# Patient Record
Sex: MALE | Race: WHITE | NOT HISPANIC OR LATINO | Employment: OTHER | URBAN - NONMETROPOLITAN AREA
[De-identification: names, ages, dates, MRNs, and addresses within clinical notes are randomized per-mention and may not be internally consistent; named-entity substitution may affect disease eponyms.]

---

## 2018-11-07 ENCOUNTER — OFFICE VISIT (OUTPATIENT)
Dept: SURGERY | Facility: CLINIC | Age: 58
End: 2018-11-07

## 2018-11-07 VITALS
DIASTOLIC BLOOD PRESSURE: 74 MMHG | SYSTOLIC BLOOD PRESSURE: 128 MMHG | OXYGEN SATURATION: 98 % | HEIGHT: 70 IN | HEART RATE: 60 BPM | WEIGHT: 229.2 LBS | BODY MASS INDEX: 32.81 KG/M2 | TEMPERATURE: 97.7 F

## 2018-11-07 DIAGNOSIS — K62.5 RECTAL BLEEDING: ICD-10-CM

## 2018-11-07 DIAGNOSIS — R10.30 LOWER ABDOMINAL PAIN: Primary | ICD-10-CM

## 2018-11-07 DIAGNOSIS — K59.04 CHRONIC IDIOPATHIC CONSTIPATION: ICD-10-CM

## 2018-11-07 PROCEDURE — 99213 OFFICE O/P EST LOW 20 MIN: CPT | Performed by: SURGERY

## 2018-11-07 RX ORDER — AMLODIPINE BESYLATE 10 MG/1
10 TABLET ORAL DAILY
Refills: 1 | COMMUNITY
Start: 2018-08-27

## 2018-11-07 RX ORDER — SIMVASTATIN 40 MG
40 TABLET ORAL NIGHTLY
Refills: 1 | COMMUNITY
Start: 2018-08-27

## 2018-11-07 RX ORDER — OMEPRAZOLE 40 MG/1
40 CAPSULE, DELAYED RELEASE ORAL DAILY
Refills: 1 | COMMUNITY
Start: 2018-08-27

## 2018-11-07 RX ORDER — ALBUTEROL SULFATE 1.25 MG/3ML
1 SOLUTION RESPIRATORY (INHALATION) EVERY 6 HOURS PRN
COMMUNITY

## 2018-11-07 RX ORDER — PREDNISONE 20 MG/1
20 TABLET ORAL DAILY PRN
Refills: 0 | COMMUNITY
Start: 2018-10-02

## 2018-11-07 RX ORDER — TAMSULOSIN HYDROCHLORIDE 0.4 MG/1
1 CAPSULE ORAL DAILY
Refills: 1 | COMMUNITY
Start: 2018-10-01

## 2018-11-07 RX ORDER — IBUPROFEN 200 MG
400 TABLET ORAL EVERY MORNING
COMMUNITY

## 2018-11-07 RX ORDER — BISOPROLOL FUMARATE 5 MG/1
5 TABLET, FILM COATED ORAL DAILY
Refills: 1 | COMMUNITY
Start: 2018-08-27

## 2018-11-07 RX ORDER — OXYBUTYNIN CHLORIDE 10 MG/1
10 TABLET, EXTENDED RELEASE ORAL DAILY
Refills: 1 | COMMUNITY
Start: 2018-10-01

## 2018-11-07 RX ORDER — ASPIRIN 325 MG
325 TABLET ORAL DAILY
COMMUNITY

## 2018-11-07 RX ORDER — BISACODYL 5 MG/1
10 TABLET, DELAYED RELEASE ORAL 2 TIMES DAILY
Qty: 4 TABLET | Refills: 0 | Status: SHIPPED | OUTPATIENT
Start: 2018-11-07 | End: 2018-11-08

## 2018-11-07 RX ORDER — BUDESONIDE AND FORMOTEROL FUMARATE DIHYDRATE 160; 4.5 UG/1; UG/1
2 AEROSOL RESPIRATORY (INHALATION)
Refills: 1 | COMMUNITY
Start: 2018-08-27

## 2018-11-07 RX ORDER — POLYETHYLENE GLYCOL 1450
1 POWDER (GRAM) MISCELLANEOUS
Qty: 238 G | Refills: 0 | Status: SHIPPED | OUTPATIENT
Start: 2018-11-07 | End: 2018-11-07

## 2018-11-07 NOTE — PROGRESS NOTES
Patient: Jason Cervantes    YOB: 1960    Date: 11/07/2018    Primary Care Provider: Cleveland Chance DO    Chief Complaint   Patient presents with   • Hemorrhoids     rectal bleeding for years due to hemorrhoids   • Abdominal Pain     lower abdominal pain   • Constipation     chronic        Subjective .     History of present illness:  I saw the patient in the office today as a consultation for evaluation and treatment of sharp intermittent lower abdominal pain, chronic constipation and rectal bleeding.  Patient states that he has had abdominal pain, constipation and rectal bleeding for many years.  He does have a history of hemorrhoids. Abdominal pain is relieved by a bowel movement. Patient states that he is due for a colonoscopy.  Last colonoscopy was 10 plus years ago and performed by myself.     The patient does have a history significant for rectal bleeding, this is not gotten any better recently.    The following portions of the patient's history were reviewed and updated as appropriate: allergies, current medications, past family history, past medical history, past social history, past surgical history and problem list.      Review of Systems   Constitutional: Negative for chills, fever and unexpected weight change.   HENT: Negative for trouble swallowing and voice change.    Eyes: Negative for visual disturbance.   Respiratory: Positive for cough and shortness of breath. Negative for apnea, chest tightness and wheezing.    Cardiovascular: Positive for chest pain. Negative for palpitations and leg swelling.   Gastrointestinal: Positive for abdominal pain, anal bleeding and constipation. Negative for abdominal distention, blood in stool, diarrhea, nausea, rectal pain and vomiting.   Endocrine: Negative for cold intolerance and heat intolerance.   Genitourinary: Negative for difficulty urinating, dysuria, flank pain, scrotal swelling and testicular pain.   Musculoskeletal: Negative for back  pain, gait problem and joint swelling.   Skin: Negative for color change, rash and wound.   Neurological: Negative for dizziness, syncope, speech difficulty, weakness, numbness and headaches.   Hematological: Negative for adenopathy. Does not bruise/bleed easily.   Psychiatric/Behavioral: Negative for confusion. The patient is not nervous/anxious.        History:  Past Medical History:   Diagnosis Date   • COPD (chronic obstructive pulmonary disease) (CMS/HCC)    • Hypertension        Past Surgical History:   Procedure Laterality Date   • AORTA SURGERY     • APPENDECTOMY     • FACIAL RECONSTRUCTION SURGERY     • LEG SURGERY Left        Family History   Problem Relation Age of Onset   • Cancer Mother    • Hypertension Mother    • Diabetes Father    • Hypertension Father        Social History     Tobacco Use   • Smoking status: Never Smoker   • Smokeless tobacco: Never Used   Substance Use Topics   • Alcohol use: No   • Drug use: Defer       Allergies:  No Known Allergies    Medications:    Current Outpatient Medications:   •  acetaminophen (TYLENOL) 100 MG/ML solution, Take 10 mg/kg by mouth Every 4 (Four) Hours As Needed for Fever., Disp: , Rfl:   •  albuterol (ACCUNEB) 1.25 MG/3ML nebulizer solution, Take 1 ampule by nebulization Every 6 (Six) Hours As Needed for Wheezing., Disp: , Rfl:   •  amLODIPine (NORVASC) 10 MG tablet, , Disp: , Rfl: 1  •  aspirin 325 MG tablet, Take 325 mg by mouth Daily., Disp: , Rfl:   •  bisoprolol (ZEBeta) 5 MG tablet, , Disp: , Rfl: 1  •  ibuprofen (ADVIL,MOTRIN) 200 MG tablet, Take 200 mg by mouth Every 6 (Six) Hours As Needed for Mild Pain ., Disp: , Rfl:   •  omeprazole (priLOSEC) 40 MG capsule, , Disp: , Rfl: 1  •  oxybutynin XL (DITROPAN-XL) 10 MG 24 hr tablet, , Disp: , Rfl: 1  •  predniSONE (DELTASONE) 20 MG tablet, , Disp: , Rfl: 0  •  simvastatin (ZOCOR) 40 MG tablet, , Disp: , Rfl: 1  •  SYMBICORT 160-4.5 MCG/ACT inhaler, , Disp: , Rfl: 1  •  tamsulosin (FLOMAX) 0.4 MG  "capsule 24 hr capsule, , Disp: , Rfl: 1    Objective     Vital Signs:   Vitals:    11/07/18 1434   BP: 128/74   Pulse: 60   Temp: 97.7 °F (36.5 °C)   TempSrc: Temporal Artery    SpO2: 98%   Weight: 104 kg (229 lb 3.2 oz)   Height: 177.8 cm (70\")       Physical Exam:   General Appearance:    Alert, cooperative, in no acute distress   Head:    Normocephalic, without obvious abnormality, atraumatic   Eyes:            Lids and lashes normal, conjunctivae and sclerae normal, no   icterus, no pallor, corneas clear, PERRL   Ears:    Ears appear intact with no abnormalities noted   Throat:   No oral lesions, no thrush, oral mucosa moist   Neck:   No adenopathy, supple, trachea midline, no thyromegaly,  no JVD   Lungs:     Clear to auscultation,respirations regular, even and                  unlabored    Heart:    Regular rhythm and normal rate, normal S1 and S2, no            murmur   Abdomen:     no masses, no organomegaly, soft non-tender, non-distended, no guarding, there is no evidence of tenderness, no evidence of hernia    Extremities:   Moves all extremities well, no edema, no cyanosis, no             redness   Pulses:   Pulses palpable and equal bilaterally   Skin:   No bleeding, bruising or rash   Lymph nodes:   No palpable adenopathy   Neurologic:   Cranial nerves 2 - 12 grossly intact, sensation intact      Results Review:   I reviewed the patient's new clinical results.  I reviewed the patient's new imaging results and agree with the interpretation.  I reviewed the patient's other test results and agree with the interpretation    Review of Systems was reviewed and confirmed as accurate today.    Assessment/Plan :    1. Lower abdominal pain    2. Chronic idiopathic constipation    3. Rectal bleeding        I recommend a colonoscopy for further evaluation. The procedure was explained as well as the risks which include but are not limited to bleeding, infection, perforation, abdominal pain etc. The patient " understands these risks and the procedure and wishes to proceed.  The patient had no questions for me.    Electronically signed by Moshe Guerra MD  11/12/18 2:42 PM  Portions of this note have been scribed for Moshe Guerra MD by Renee Molina 11/12/2018  11:16 AM

## 2018-11-13 PROBLEM — K62.5 RECTAL BLEEDING: Status: ACTIVE | Noted: 2018-11-13

## 2018-11-13 PROBLEM — R10.30 LOWER ABDOMINAL PAIN: Status: ACTIVE | Noted: 2018-11-13

## 2018-11-13 PROBLEM — K59.04 CHRONIC IDIOPATHIC CONSTIPATION: Status: ACTIVE | Noted: 2018-11-13

## 2018-12-06 ENCOUNTER — TELEPHONE (OUTPATIENT)
Dept: SURGERY | Facility: CLINIC | Age: 58
End: 2018-12-06

## 2018-12-06 NOTE — TELEPHONE ENCOUNTER
Called the patient to remind them of an upcoming appointment with general surgery. I was able to reach to reach the patient. Left message.

## 2018-12-07 RX ORDER — LOSARTAN POTASSIUM 50 MG/1
50 TABLET ORAL EVERY OTHER DAY
COMMUNITY

## 2018-12-07 RX ORDER — FUROSEMIDE 20 MG/1
20 TABLET ORAL DAILY PRN
COMMUNITY

## 2018-12-07 RX ORDER — ACETAMINOPHEN 500 MG
1000 TABLET ORAL EVERY MORNING
COMMUNITY

## 2018-12-10 ENCOUNTER — TELEPHONE (OUTPATIENT)
Dept: SURGERY | Facility: CLINIC | Age: 58
End: 2018-12-10

## 2018-12-13 ENCOUNTER — TELEPHONE (OUTPATIENT)
Dept: SURGERY | Facility: CLINIC | Age: 58
End: 2018-12-13

## 2018-12-17 ENCOUNTER — HOSPITAL ENCOUNTER (OUTPATIENT)
Facility: HOSPITAL | Age: 58
Setting detail: HOSPITAL OUTPATIENT SURGERY
Discharge: HOME | End: 2018-12-17
Attending: SURGERY | Admitting: SURGERY

## 2018-12-17 ENCOUNTER — ANESTHESIA EVENT (OUTPATIENT)
Dept: GASTROENTEROLOGY | Facility: HOSPITAL | Age: 58
End: 2018-12-17

## 2018-12-17 ENCOUNTER — ANESTHESIA (OUTPATIENT)
Dept: GASTROENTEROLOGY | Facility: HOSPITAL | Age: 58
End: 2018-12-17

## 2018-12-17 VITALS
DIASTOLIC BLOOD PRESSURE: 54 MMHG | TEMPERATURE: 98.3 F | RESPIRATION RATE: 18 BRPM | HEIGHT: 70 IN | HEART RATE: 58 BPM | SYSTOLIC BLOOD PRESSURE: 108 MMHG | WEIGHT: 223 LBS | BODY MASS INDEX: 31.92 KG/M2 | OXYGEN SATURATION: 96 %

## 2018-12-17 DIAGNOSIS — K59.04 CHRONIC IDIOPATHIC CONSTIPATION: ICD-10-CM

## 2018-12-17 DIAGNOSIS — K62.5 RECTAL BLEEDING: ICD-10-CM

## 2018-12-17 DIAGNOSIS — R10.30 LOWER ABDOMINAL PAIN: ICD-10-CM

## 2018-12-17 PROCEDURE — 25010000002 PROPOFOL 200 MG/20ML EMULSION: Performed by: NURSE ANESTHETIST, CERTIFIED REGISTERED

## 2018-12-17 PROCEDURE — S0260 H&P FOR SURGERY: HCPCS | Performed by: SURGERY

## 2018-12-17 PROCEDURE — 25010000002 MIDAZOLAM PER 1 MG: Performed by: NURSE ANESTHETIST, CERTIFIED REGISTERED

## 2018-12-17 RX ORDER — MAGNESIUM HYDROXIDE 1200 MG/15ML
LIQUID ORAL AS NEEDED
Status: DISCONTINUED | OUTPATIENT
Start: 2018-12-17 | End: 2018-12-17 | Stop reason: HOSPADM

## 2018-12-17 RX ORDER — MEPERIDINE HYDROCHLORIDE 50 MG/ML
INJECTION INTRAMUSCULAR; INTRAVENOUS; SUBCUTANEOUS AS NEEDED
Status: DISCONTINUED | OUTPATIENT
Start: 2018-12-17 | End: 2018-12-17 | Stop reason: SURG

## 2018-12-17 RX ORDER — PROPOFOL 10 MG/ML
INJECTION, EMULSION INTRAVENOUS AS NEEDED
Status: DISCONTINUED | OUTPATIENT
Start: 2018-12-17 | End: 2018-12-17 | Stop reason: SURG

## 2018-12-17 RX ORDER — MIDAZOLAM HYDROCHLORIDE 1 MG/ML
INJECTION INTRAMUSCULAR; INTRAVENOUS AS NEEDED
Status: DISCONTINUED | OUTPATIENT
Start: 2018-12-17 | End: 2018-12-17 | Stop reason: SURG

## 2018-12-17 RX ORDER — SODIUM CHLORIDE, SODIUM LACTATE, POTASSIUM CHLORIDE, CALCIUM CHLORIDE 600; 310; 30; 20 MG/100ML; MG/100ML; MG/100ML; MG/100ML
1000 INJECTION, SOLUTION INTRAVENOUS CONTINUOUS
Status: DISCONTINUED | OUTPATIENT
Start: 2018-12-17 | End: 2018-12-17 | Stop reason: HOSPADM

## 2018-12-17 RX ORDER — SODIUM CHLORIDE 0.9 % (FLUSH) 0.9 %
3 SYRINGE (ML) INJECTION AS NEEDED
Status: DISCONTINUED | OUTPATIENT
Start: 2018-12-17 | End: 2018-12-17 | Stop reason: HOSPADM

## 2018-12-17 RX ADMIN — PROPOFOL 20 MG: 10 INJECTION, EMULSION INTRAVENOUS at 14:11

## 2018-12-17 RX ADMIN — MIDAZOLAM HYDROCHLORIDE 2 MG: 1 INJECTION, SOLUTION INTRAMUSCULAR; INTRAVENOUS at 13:57

## 2018-12-17 RX ADMIN — PROPOFOL 20 MG: 10 INJECTION, EMULSION INTRAVENOUS at 14:05

## 2018-12-17 RX ADMIN — PROPOFOL 20 MG: 10 INJECTION, EMULSION INTRAVENOUS at 14:08

## 2018-12-17 RX ADMIN — PROPOFOL 50 MG: 10 INJECTION, EMULSION INTRAVENOUS at 14:02

## 2018-12-17 RX ADMIN — SODIUM CHLORIDE, POTASSIUM CHLORIDE, SODIUM LACTATE AND CALCIUM CHLORIDE 1000 ML: 600; 310; 30; 20 INJECTION, SOLUTION INTRAVENOUS at 13:04

## 2018-12-17 RX ADMIN — MEPERIDINE HYDROCHLORIDE 50 MG: 50 INJECTION, SOLUTION INTRAMUSCULAR; INTRAVENOUS; SUBCUTANEOUS at 13:59

## 2018-12-17 NOTE — ANESTHESIA PREPROCEDURE EVALUATION
Anesthesia Evaluation     Patient summary reviewed and Nursing notes reviewed   no history of anesthetic complications:  NPO Solid Status: > 8 hours  NPO Liquid Status: > 8 hours           Airway   Mallampati: II  TM distance: >3 FB  Possible difficult intubation, Large neck circumference and Difficult intubation highly probable  Dental    (+) upper dentures and lower dentures    Pulmonary    (+) pneumonia resolved , a smoker Former, COPD, asthma, shortness of breath, sleep apnea, decreased breath sounds,   Cardiovascular     PT is on anticoagulation therapy  Patient on routine beta blocker    (+) hypertension, FALK, PVD, hyperlipidemia,   CAD:  inc risk.      Neuro/Psych  (+) poor historian.,     GI/Hepatic/Renal/Endo    (+) obesity, morbid obesity, GERD, GI bleeding,   Diabetes:  inc risk.    Musculoskeletal     (+) arthralgias, back pain, chronic pain,   Abdominal   (+) obese,    Substance History      OB/GYN          Other   (+) arthritis                     Anesthesia Plan    ASA 3     MAC   (Risks and benefits discussed including risk of aspiration, recall and dental damage. All patient questions answered. Will continue with POC.)  intravenous induction   Anesthetic plan, all risks, benefits, and alternatives have been provided, discussed and informed consent has been obtained with: patient.

## 2018-12-17 NOTE — ANESTHESIA POSTPROCEDURE EVALUATION
Patient: Jason Cervantes    Procedure Summary     Date:  12/17/18 Room / Location:  [facility] ENDOSCOPY 2 / [facility] ENDOSCOPY    Anesthesia Start:  1355 Anesthesia Stop:      Procedure:  COLONOSCOPY WITH HOT BIOPSY POLYPECTOMY (N/A ) Diagnosis:       Lower abdominal pain      Chronic idiopathic constipation      Rectal bleeding      (Lower abdominal pain [R10.30])      (Chronic idiopathic constipation [K59.04])      (Rectal bleeding [K62.5])    Surgeon:  Moshe Guerra MD Provider:  Cleveland Meyer CRNA    Anesthesia Type:  MAC ASA Status:  3          Anesthesia Type: MAC  Last vitals  BP   123/69 (12/17/18 1252)   Temp   98.3 °F (36.8 °C) (12/17/18 1252)   Pulse   57 (12/17/18 1252)   Resp   18 (12/17/18 1252)     SpO2   96 % (12/17/18 1252)     Post Anesthesia Care and Evaluation    Patient location during evaluation: PHASE II  Patient participation: complete - patient participated  Level of consciousness: awake  Pain score: 0  Pain management: adequate  Airway patency: patent  Anesthetic complications: No anesthetic complications  PONV Status: none  Cardiovascular status: acceptable  Respiratory status: acceptable and nasal cannula  Hydration status: acceptable    Comments: vsss resp spont, reflexes intact, responsive, report given to pacu nurse

## 2018-12-17 NOTE — DISCHARGE INSTRUCTIONS
Please follow all post op instructions and follow up appointment time from your physician's office included in your discharge packet.      Follow your physicians instructions as previously directed.    To assist you in voiding:  Drink plenty of fluids  Listen to running water while attempting to void.    If you are unable to urinate and you have an uncomfortable urge to void or it has been   6 hours since you were discharged, return to the Emergency Room    No pushing, pulling, tugging,  heavy lifting, or strenuous activity.  No major decision making, driving, or drinking alcoholic beverages for 24 hours. ( due to the medications you have  received)  Always use good hand hygiene/washing techniques.  NO driving while taking pain medications.

## 2018-12-17 NOTE — H&P
Moshe Guerra MD   Physician   General Surgery   Progress Notes   Signed   Encounter Date:  11/7/2018               Signed        Expand All Collapse All          Show:Clear all  [x]Manual[x]Template[]Copied    Added by:  [x]Moshe Guerra MD[x]Renee Molina RT      []Nino for details      Patient: Jason Cervantes     YOB: 1960     Date: 11/07/2018     Primary Care Provider: Cleveland Chance DO          Chief Complaint   Patient presents with   • Hemorrhoids       rectal bleeding for years due to hemorrhoids   • Abdominal Pain       lower abdominal pain   • Constipation       chronic             Subjective    .      History of present illness:  I saw the patient in the office today as a consultation for evaluation and treatment of sharp intermittent lower abdominal pain, chronic constipation and rectal bleeding.  Patient states that he has had abdominal pain, constipation and rectal bleeding for many years.  He does have a history of hemorrhoids. Abdominal pain is relieved by a bowel movement. Patient states that he is due for a colonoscopy.  Last colonoscopy was 10 plus years ago and performed by myself.      The patient does have a history significant for rectal bleeding, this is not gotten any better recently.     The following portions of the patient's history were reviewed and updated as appropriate: allergies, current medications, past family history, past medical history, past social history, past surgical history and problem list.        Review of Systems   Constitutional: Negative for chills, fever and unexpected weight change.   HENT: Negative for trouble swallowing and voice change.    Eyes: Negative for visual disturbance.   Respiratory: Positive for cough and shortness of breath. Negative for apnea, chest tightness and wheezing.    Cardiovascular: Positive for chest pain. Negative for palpitations and leg swelling.   Gastrointestinal: Positive for abdominal pain, anal  bleeding and constipation. Negative for abdominal distention, blood in stool, diarrhea, nausea, rectal pain and vomiting.   Endocrine: Negative for cold intolerance and heat intolerance.   Genitourinary: Negative for difficulty urinating, dysuria, flank pain, scrotal swelling and testicular pain.   Musculoskeletal: Negative for back pain, gait problem and joint swelling.   Skin: Negative for color change, rash and wound.   Neurological: Negative for dizziness, syncope, speech difficulty, weakness, numbness and headaches.   Hematological: Negative for adenopathy. Does not bruise/bleed easily.   Psychiatric/Behavioral: Negative for confusion. The patient is not nervous/anxious.          History:  Medical History        Past Medical History:   Diagnosis Date   • COPD (chronic obstructive pulmonary disease) (CMS/HCC)     • Hypertension              Surgical History         Past Surgical History:   Procedure Laterality Date   • AORTA SURGERY       • APPENDECTOMY       • FACIAL RECONSTRUCTION SURGERY       • LEG SURGERY Left                    Family History   Problem Relation Age of Onset   • Cancer Mother     • Hypertension Mother     • Diabetes Father     • Hypertension Father           Social History           Tobacco Use   • Smoking status: Never Smoker   • Smokeless tobacco: Never Used   Substance Use Topics   • Alcohol use: No   • Drug use: Defer         Allergies:  No Known Allergies     Medications:     Current Outpatient Medications:   •  acetaminophen (TYLENOL) 100 MG/ML solution, Take 10 mg/kg by mouth Every 4 (Four) Hours As Needed for Fever., Disp: , Rfl:   •  albuterol (ACCUNEB) 1.25 MG/3ML nebulizer solution, Take 1 ampule by nebulization Every 6 (Six) Hours As Needed for Wheezing., Disp: , Rfl:   •  amLODIPine (NORVASC) 10 MG tablet, , Disp: , Rfl: 1  •  aspirin 325 MG tablet, Take 325 mg by mouth Daily., Disp: , Rfl:   •  bisoprolol (ZEBeta) 5 MG tablet, , Disp: , Rfl: 1  •  ibuprofen (ADVIL,MOTRIN) 200  "MG tablet, Take 200 mg by mouth Every 6 (Six) Hours As Needed for Mild Pain ., Disp: , Rfl:   •  omeprazole (priLOSEC) 40 MG capsule, , Disp: , Rfl: 1  •  oxybutynin XL (DITROPAN-XL) 10 MG 24 hr tablet, , Disp: , Rfl: 1  •  predniSONE (DELTASONE) 20 MG tablet, , Disp: , Rfl: 0  •  simvastatin (ZOCOR) 40 MG tablet, , Disp: , Rfl: 1  •  SYMBICORT 160-4.5 MCG/ACT inhaler, , Disp: , Rfl: 1  •  tamsulosin (FLOMAX) 0.4 MG capsule 24 hr capsule, , Disp: , Rfl: 1          Objective         Vital Signs:   Vitals       Vitals:     11/07/18 1434   BP: 128/74   Pulse: 60   Temp: 97.7 °F (36.5 °C)   TempSrc: Temporal Artery    SpO2: 98%   Weight: 104 kg (229 lb 3.2 oz)   Height: 177.8 cm (70\")            Physical Exam:           General Appearance:    Alert, cooperative, in no acute distress   Head:    Normocephalic, without obvious abnormality, atraumatic   Eyes:            Lids and lashes normal, conjunctivae and sclerae normal, no   icterus, no pallor, corneas clear, PERRL   Ears:    Ears appear intact with no abnormalities noted   Throat:   No oral lesions, no thrush, oral mucosa moist   Neck:   No adenopathy, supple, trachea midline, no thyromegaly,  no JVD   Lungs:     Clear to auscultation,respirations regular, even and                  unlabored    Heart:    Regular rhythm and normal rate, normal S1 and S2, no            murmur   Abdomen:     no masses, no organomegaly, soft non-tender, non-distended, no guarding, there is no evidence of tenderness, no evidence of hernia    Extremities:   Moves all extremities well, no edema, no cyanosis, no             redness   Pulses:   Pulses palpable and equal bilaterally   Skin:   No bleeding, bruising or rash   Lymph nodes:   No palpable adenopathy   Neurologic:   Cranial nerves 2 - 12 grossly intact, sensation intact       Results Review:              I reviewed the patient's new clinical results.  I reviewed the patient's new imaging results and agree with the interpretation.  I " reviewed the patient's other test results and agree with the interpretation     Review of Systems was reviewed and confirmed as accurate today.          Assessment/Plan    :     1. Lower abdominal pain    2. Chronic idiopathic constipation    3. Rectal bleeding          I recommend a colonoscopy for further evaluation. The procedure was explained as well as the risks which include but are not limited to bleeding, infection, perforation, abdominal pain etc. The patient understands these risks and the procedure and wishes to proceed.  The patient had no questions for me.     Electronically signed by Moshe Guerra MD  11/12/18 2:42 PM  Portions of this note have been scribed for Moshe Guerra MD by Renee Molina 11/12/2018  11:16 AM      December 17, 2018    Update today - no changes in the exam.    MD Leah, [facility]                            Office Visit on 11/7/2018            Revision History            Detailed Report

## 2018-12-21 LAB
LAB AP CASE REPORT: NORMAL
PATH REPORT.FINAL DX SPEC: NORMAL

## 2022-06-07 ENCOUNTER — TELEPHONE (OUTPATIENT)
Dept: SURGERY | Facility: CLINIC | Age: 62
End: 2022-06-07

## 2022-06-16 NOTE — TELEPHONE ENCOUNTER
"I called pt in regards to screening colonoscopy, I left message on voice mail asking him to return my call.  I suspect that pt will need to be seen in the office prior to scheduling secondary to \"breathing problems.\"  "

## 2022-06-20 NOTE — TELEPHONE ENCOUNTER
I called pt again, I left another message on voice mail asking pt to return my call.  I will mail pt a reminder letter as well.

## 2022-07-22 ENCOUNTER — TELEPHONE (OUTPATIENT)
Dept: SURGERY | Facility: CLINIC | Age: 62
End: 2022-07-22

## 2022-07-22 NOTE — TELEPHONE ENCOUNTER
Called patient to reschedule appointment.  LVM and mailed letter with new appointment scheduled 8/15/2022 at 11:10.

## 2022-08-12 NOTE — PROGRESS NOTES
Patient: Jason Cervantes    YOB: 1960    Date: 08/15/2022    Primary Care Provider: Cleveland Chance DO    Chief Complaint   Patient presents with   • Colonoscopy     Consult       SUBJECTIVE:    History of present illness:  Patient has a history of CAD and he has a history of tubular adenoma colon polyps.  I saw the patient in the office today as a consultation for a colonoscopy.    There is a history significant for colon polyps, the patient has not had a colonoscopy for several years.    The following portions of the patient's history were reviewed and updated as appropriate: allergies, current medications, past family history, past medical history, past social history, past surgical history and problem list.    Review of Systems   Constitutional: Negative for activity change, chills, fever and unexpected weight change.   HENT: Negative for hearing loss, trouble swallowing and voice change.    Eyes: Negative for visual disturbance.   Respiratory: Negative for apnea, cough, chest tightness, shortness of breath and wheezing.    Cardiovascular: Negative for chest pain, palpitations and leg swelling.   Gastrointestinal: Positive for blood in stool and constipation. Negative for abdominal distention, abdominal pain, anal bleeding, diarrhea, nausea, rectal pain and vomiting.   Endocrine: Negative for cold intolerance and heat intolerance.   Genitourinary: Negative for difficulty urinating, dysuria, flank pain, scrotal swelling and testicular pain.   Musculoskeletal: Negative for back pain, gait problem and joint swelling.   Skin: Negative for color change, rash and wound.   Allergic/Immunologic: Positive for environmental allergies.   Neurological: Negative for dizziness, syncope, speech difficulty, weakness, light-headedness, numbness and headaches.   Hematological: Negative for adenopathy. Does not bruise/bleed easily.   Psychiatric/Behavioral: Negative for confusion. The patient is not  "nervous/anxious.        History:  Past Medical History:   Diagnosis Date   • Aorta aneurysm (HCC)     REPORTS REPAIRED AROUND 2009 OR 2010   • Arthritis    • Asthma    • Constipation    • COPD (chronic obstructive pulmonary disease) (HCC)    • Cough    • Elevated cholesterol    • Frequency of urination    • Full dentures     INSTRUCTED NO ADHESIVES THE DOS   • History of chest pain    • History of fracture     REPORTS LEFT FEMUR, FACIAL BONES   • History of irregular heartbeat    • History of migraine    • History of pneumonia    • History of transfusion     REPORTS NO REACTION TO THE TRANSFUSION   • Hypertension    • Poor historian    • Poor historian     REGARDING TIMELINE OF HEALTH HISTORY   • Rectal bleeding    • Sleep apnea     CPAP HS - TO BRING IN DOS       Past Surgical History:   Procedure Laterality Date   • AORTA SURGERY      ANEURYSM REPAIR.  PATIENT REPORTS IN 2009.  PATIENT REPORTS \"THERE ARE 10\" OF PLASTIC TUBE\"   • APPENDECTOMY     • CARDIAC CATHETERIZATION  2017    REPORTS THIS WAS THE LAST CATH THAT WAS DONE.  REPORTS NO ARDIAC STENTS.  REPORTS WAS DONE FOR EVALUATION OF AORTA ANEURYSM AND REPORTED THAT THIS IS LOCATED BEHIND THE HEART.    • COLONOSCOPY      REPORTS 7 OR 8 TIMES IN THE PAST   • COLONOSCOPY N/A 12/17/2018    Procedure: COLONOSCOPY WITH HOT BIOPSY POLYPECTOMY;  Surgeon: Moshe Guerra MD;  Location: [facility] ENDOSCOPY;  Service: Gastroenterology   • ENDOSCOPY      REPORTS DONE 7 OR 8 TIMES PREVIOUSLY    • EYE SURGERY Bilateral     CATARACT EXTRACTIONS   • FACIAL RECONSTRUCTION SURGERY     • FRACTURE SURGERY Left     FEMUR.  REPORTS HARDWARE INTACT.    • MOUTH SURGERY      FULL MOUTH EXTRACTION       Family History   Problem Relation Age of Onset   • Cancer Mother    • Hypertension Mother    • Diabetes Father    • Hypertension Father        Social History     Tobacco Use   • Smoking status: Never Smoker   • Smokeless tobacco: Never Used   Vaping Use   • Vaping Use: Never used "   Substance Use Topics   • Alcohol use: No   • Drug use: Defer       Allergies:  No Known Allergies    Medications:    Current Outpatient Medications:   •  acetaminophen (TYLENOL) 500 MG tablet, Take 1,000 mg by mouth Every Morning. REPORTS TAKES EVERY MORNING WITH MOTRIN 400 MG, Disp: , Rfl:   •  albuterol (ACCUNEB) 1.25 MG/3ML nebulizer solution, Take 1 ampule by nebulization Every 6 (Six) Hours As Needed for Wheezing or Shortness of Air., Disp: , Rfl:   •  amLODIPine (NORVASC) 10 MG tablet, Take 10 mg by mouth Daily., Disp: , Rfl: 1  •  aspirin 325 MG tablet, Take 325 mg by mouth Daily., Disp: , Rfl:   •  bisoprolol (ZEBeta) 5 MG tablet, Take 5 mg by mouth Daily., Disp: , Rfl: 1  •  ferrous sulfate 325 (65 FE) MG tablet, FeroSul 325 mg (65 mg iron) tablet, Disp: , Rfl:   •  fluticasone (FLONASE) 50 MCG/ACT nasal spray, fluticasone propionate 50 mcg/actuation nasal spray,suspension  Spray 2 sprays every day by intranasal route., Disp: , Rfl:   •  furosemide (LASIX) 20 MG tablet, Take 20 mg by mouth Daily As Needed (SWELLING/FLUID RETENTION)., Disp: , Rfl:   •  ibuprofen (ADVIL,MOTRIN) 200 MG tablet, Take 400 mg by mouth Every Morning. REPORTS TAKES WITH ACETAMINOPHEN 1000 MG, Disp: , Rfl:   •  linaclotide (LINZESS) 145 MCG capsule capsule, Linzess 145 mcg capsule  TAKE 1 CAPSULE(S) EVERY DAY BY ORAL ROUTE FOR 30 DAYS., Disp: , Rfl:   •  losartan (COZAAR) 50 MG tablet, Take 50 mg by mouth Every Other Day., Disp: , Rfl:   •  montelukast (SINGULAIR) 10 MG tablet, , Disp: , Rfl:   •  omeprazole (priLOSEC) 40 MG capsule, Take 40 mg by mouth Daily., Disp: , Rfl: 1  •  oxybutynin XL (DITROPAN-XL) 10 MG 24 hr tablet, Take 10 mg by mouth Daily., Disp: , Rfl: 1  •  predniSONE (DELTASONE) 20 MG tablet, Take 20 mg by mouth Daily As Needed (ONSET OF EMPHYSEMA/BRONCHITIS EXACERBATION)., Disp: , Rfl: 0  •  simvastatin (ZOCOR) 40 MG tablet, Take 40 mg by mouth Every Night., Disp: , Rfl: 1  •  SYMBICORT 160-4.5 MCG/ACT inhaler,  "Inhale 2 puffs 2 (Two) Times a Day., Disp: , Rfl: 1  •  tamsulosin (FLOMAX) 0.4 MG capsule 24 hr capsule, Take 1 capsule by mouth Daily., Disp: , Rfl: 1  •  tiotropium (SPIRIVA) 18 MCG per inhalation capsule, Spiriva with HandiHaler 18 mcg and inhalation capsules, Disp: , Rfl:   •  bisacodyl (Dulcolax) 5 MG EC tablet, Take 2 @ 3pm, 2 @ 7 pm day prior to colonoscopy, Disp: 4 tablet, Rfl: 0  •  hydrOXYzine (ATARAX) 25 MG tablet, hydroxyzine HCl 25 mg tablet, Disp: , Rfl:   •  potassium chloride 10 MEQ CR tablet, , Disp: , Rfl:     OBJECTIVE:    Vital Signs:   Vitals:    08/15/22 1120   BP: 122/68   Pulse: 54   Resp: 16   Temp: 97.7 °F (36.5 °C)   TempSrc: Temporal   SpO2: 97%   Weight: 96.9 kg (213 lb 9.6 oz)   Height: 177.8 cm (70\")       Physical Exam:   General Appearance:    Alert, cooperative, in no acute distress   Head:    Normocephalic, without obvious abnormality, atraumatic   Eyes:            Lids and lashes normal, conjunctivae and sclerae normal, no   icterus, no pallor, corneas clear, PERRL   Ears:    Ears appear intact with no abnormalities noted   Throat:   No oral lesions, no thrush, oral mucosa moist   Neck:   No adenopathy, supple, trachea midline, no thyromegaly,  no JVD   Lungs:     Clear to auscultation,respirations regular, even and                  unlabored    Heart:    Regular rhythm and normal rate, normal S1 and S2, no            murmur   Abdomen:     no masses, no organomegaly, soft non-tender, non-distended, no guarding, there is no evidence of tenderness, no peritoneal signs   Extremities:   Moves all extremities well, no edema, no cyanosis, no             redness   Pulses:   Pulses palpable and equal bilaterally   Skin:   No bleeding, bruising or rash   Lymph nodes:   No palpable adenopathy   Neurologic:   Cranial nerves 2 - 12 grossly intact, sensation intact      Results Review:   I reviewed the patient's new clinical results.  I reviewed the patient's new imaging results and agree " with the interpretation.  I reviewed the patient's other test results and agree with the interpretation    Review of Systems was reviewed and confirmed as accurate as documented by the MA.    ASSESSMENT/PLAN:    1. History of colon polyps        I recommend a colonoscopy for further evaluation. The procedure was explained as well as the risks which include but are not limited to bleeding, infection, perforation, abdominal pain etc. The patient understands these risks and the procedure and wishes to proceed.     Electronically signed by Moshe Guerra MD  08/16/22 11:10 EDT

## 2022-08-15 ENCOUNTER — OFFICE VISIT (OUTPATIENT)
Dept: SURGERY | Facility: CLINIC | Age: 62
End: 2022-08-15

## 2022-08-15 VITALS
TEMPERATURE: 97.7 F | DIASTOLIC BLOOD PRESSURE: 68 MMHG | RESPIRATION RATE: 16 BRPM | BODY MASS INDEX: 30.58 KG/M2 | WEIGHT: 213.6 LBS | SYSTOLIC BLOOD PRESSURE: 122 MMHG | OXYGEN SATURATION: 97 % | HEIGHT: 70 IN | HEART RATE: 54 BPM

## 2022-08-15 DIAGNOSIS — Z86.010 HISTORY OF COLON POLYPS: Primary | ICD-10-CM

## 2022-08-15 PROCEDURE — 99203 OFFICE O/P NEW LOW 30 MIN: CPT | Performed by: SURGERY

## 2022-08-15 RX ORDER — MONTELUKAST SODIUM 10 MG/1
TABLET ORAL
COMMUNITY
Start: 2022-07-27

## 2022-08-15 RX ORDER — POLYETHYLENE GLYCOL 3350 17 G/17G
238 POWDER, FOR SOLUTION ORAL ONCE
Qty: 238 G | Refills: 0 | Status: SHIPPED | OUTPATIENT
Start: 2022-08-15 | End: 2022-08-15

## 2022-08-15 RX ORDER — HYDROXYZINE HYDROCHLORIDE 25 MG/1
TABLET, FILM COATED ORAL
COMMUNITY
End: 2022-09-14 | Stop reason: HOSPADM

## 2022-08-15 RX ORDER — FLUTICASONE PROPIONATE 50 MCG
SPRAY, SUSPENSION (ML) NASAL
COMMUNITY

## 2022-08-15 RX ORDER — POTASSIUM CHLORIDE 750 MG/1
TABLET, FILM COATED, EXTENDED RELEASE ORAL
COMMUNITY
Start: 2022-07-27

## 2022-08-15 RX ORDER — BISACODYL 5 MG
TABLET, DELAYED RELEASE (ENTERIC COATED) ORAL
Qty: 4 TABLET | Refills: 0 | Status: SHIPPED | OUTPATIENT
Start: 2022-08-15

## 2022-08-15 RX ORDER — FERROUS SULFATE 325(65) MG
TABLET ORAL
COMMUNITY
Start: 2021-07-30

## 2022-08-24 PROBLEM — Z86.0100 HISTORY OF COLON POLYPS: Status: ACTIVE | Noted: 2022-08-24

## 2022-08-24 PROBLEM — Z86.010 HISTORY OF COLON POLYPS: Status: ACTIVE | Noted: 2022-08-24

## 2022-09-09 ENCOUNTER — TELEPHONE (OUTPATIENT)
Dept: SURGERY | Facility: CLINIC | Age: 62
End: 2022-09-09

## 2022-09-09 NOTE — TELEPHONE ENCOUNTER
Called To confirm colonoscopy, 09/14/22,  Dr Guerra @ [facility] no answer,left message to call office and confirm

## 2022-09-13 NOTE — PRE-PROCEDURE INSTRUCTIONS
PAT phone history completed with pt for upcoming procedure on  9/14/2022    PAT PASS GIVEN/REVIEWED WITH PT.  VERBALIZED UNDERSTANDING OF THE FOLLOWING:  DO NOT EAT, DRINK, SMOKE, USE SMOKELESS TOBACCO OR CHEW GUM AFTER MIDNIGHT THE NIGHT BEFORE SURGERY.  THIS ALSO INCLUDES HARD CANDIES AND MINTS.    DO NOT SHAVE THE AREA TO BE OPERATED ON AT LEAST 48 HOURS PRIOR TO THE PROCEDURE.  DO NOT WEAR MAKE UP OR NAIL POLISH.  DO NOT LEAVE IN ANY PIERCING OR WEAR JEWELRY THE DAY OF SURGERY.      DO NOT USE ADHESIVES IF YOU WEAR DENTURES.    DO NOT WEAR EYE CONTACTS; BRING IN YOUR GLASSES.    ONLY TAKE MEDICATION THE MORNING OF YOUR PROCEDURE IF INSTRUCTED BY YOUR SURGEON WITH ENOUGH WATER TO SWALLOW THE MEDICATION.  IF YOUR SURGEON DID NOT SPECIFY WHICH MEDICATIONS TO TAKE, YOU WILL NEED TO CALL THEIR OFFICE FOR FURTHER INSTRUCTIONS AND DO AS THEY INSTRUCT.    LEAVE ANYTHING YOU CONSIDER VALUABLE AT HOME.    YOU WILL NEED TO ARRANGE FOR SOMEONE TO DRIVE YOU HOME AFTER SURGERY.  IT IS RECOMMENDED THAT YOU DO NOT DRIVE, WORK, DRINK ALCOHOL OR MAKE MAJOR DECISIONS FOR AT LEAST 24 HOURS AFTER YOUR PROCEDURE IS COMPLETE.      THE DAY OF YOUR PROCEDURE, BRING IN THE FOLLOWING IF APPLICABLE:   PICTURE ID AND INSURANCE/MEDICARE OR MEDICAID CARDS/ANY CO-PAY THAT MAY BE DUE   COPY OF ADVANCED DIRECTIVE/LIVING WILL/POWER OR    CPAP/BIPAP/INHALERS   SKIN PREP SHEET   YOUR PREADMISSION TESTING PASS (IF NOT A PHONE HISTORY)

## 2022-09-14 ENCOUNTER — ANESTHESIA EVENT (OUTPATIENT)
Dept: GASTROENTEROLOGY | Facility: HOSPITAL | Age: 62
End: 2022-09-14

## 2022-09-14 ENCOUNTER — HOSPITAL ENCOUNTER (OUTPATIENT)
Facility: HOSPITAL | Age: 62
Setting detail: HOSPITAL OUTPATIENT SURGERY
Discharge: HOME | End: 2022-09-14
Attending: SURGERY | Admitting: SURGERY

## 2022-09-14 ENCOUNTER — ANESTHESIA (OUTPATIENT)
Dept: GASTROENTEROLOGY | Facility: HOSPITAL | Age: 62
End: 2022-09-14

## 2022-09-14 VITALS
DIASTOLIC BLOOD PRESSURE: 74 MMHG | SYSTOLIC BLOOD PRESSURE: 129 MMHG | WEIGHT: 213 LBS | HEART RATE: 55 BPM | BODY MASS INDEX: 30.49 KG/M2 | HEIGHT: 70 IN | TEMPERATURE: 98.1 F | OXYGEN SATURATION: 94 % | RESPIRATION RATE: 20 BRPM

## 2022-09-14 DIAGNOSIS — K62.5 RECTAL BLEEDING: Primary | ICD-10-CM

## 2022-09-14 DIAGNOSIS — Z86.010 HISTORY OF COLON POLYPS: ICD-10-CM

## 2022-09-14 PROCEDURE — 88305 TISSUE EXAM BY PATHOLOGIST: CPT

## 2022-09-14 PROCEDURE — 25010000002 PROPOFOL 200 MG/20ML EMULSION: Performed by: NURSE ANESTHETIST, CERTIFIED REGISTERED

## 2022-09-14 PROCEDURE — 25010000002 ONDANSETRON PER 1 MG: Performed by: NURSE ANESTHETIST, CERTIFIED REGISTERED

## 2022-09-14 PROCEDURE — 45384 COLONOSCOPY W/LESION REMOVAL: CPT | Performed by: SURGERY

## 2022-09-14 RX ORDER — PROPOFOL 10 MG/ML
INJECTION, EMULSION INTRAVENOUS AS NEEDED
Status: DISCONTINUED | OUTPATIENT
Start: 2022-09-14 | End: 2022-09-14 | Stop reason: SURG

## 2022-09-14 RX ORDER — SODIUM CHLORIDE, SODIUM LACTATE, POTASSIUM CHLORIDE, CALCIUM CHLORIDE 600; 310; 30; 20 MG/100ML; MG/100ML; MG/100ML; MG/100ML
1000 INJECTION, SOLUTION INTRAVENOUS CONTINUOUS
Status: DISCONTINUED | OUTPATIENT
Start: 2022-09-14 | End: 2022-09-14 | Stop reason: HOSPADM

## 2022-09-14 RX ORDER — ONDANSETRON 2 MG/ML
INJECTION INTRAMUSCULAR; INTRAVENOUS AS NEEDED
Status: DISCONTINUED | OUTPATIENT
Start: 2022-09-14 | End: 2022-09-14 | Stop reason: SURG

## 2022-09-14 RX ORDER — SODIUM CHLORIDE 0.9 % (FLUSH) 0.9 %
10 SYRINGE (ML) INJECTION AS NEEDED
Status: DISCONTINUED | OUTPATIENT
Start: 2022-09-14 | End: 2022-09-14 | Stop reason: HOSPADM

## 2022-09-14 RX ORDER — MAGNESIUM HYDROXIDE 1200 MG/15ML
LIQUID ORAL AS NEEDED
Status: DISCONTINUED | OUTPATIENT
Start: 2022-09-14 | End: 2022-09-14 | Stop reason: HOSPADM

## 2022-09-14 RX ORDER — LIDOCAINE HYDROCHLORIDE 20 MG/ML
INJECTION, SOLUTION INTRAVENOUS AS NEEDED
Status: DISCONTINUED | OUTPATIENT
Start: 2022-09-14 | End: 2022-09-14 | Stop reason: SURG

## 2022-09-14 RX ADMIN — LIDOCAINE HYDROCHLORIDE 50 MG: 20 INJECTION, SOLUTION INTRAVENOUS at 09:14

## 2022-09-14 RX ADMIN — ONDANSETRON 4 MG: 2 INJECTION INTRAMUSCULAR; INTRAVENOUS at 09:26

## 2022-09-14 RX ADMIN — PROPOFOL 100 MG: 10 INJECTION, EMULSION INTRAVENOUS at 09:14

## 2022-09-14 RX ADMIN — PROPOFOL 50 MG: 10 INJECTION, EMULSION INTRAVENOUS at 09:19

## 2022-09-14 RX ADMIN — SODIUM CHLORIDE, POTASSIUM CHLORIDE, SODIUM LACTATE AND CALCIUM CHLORIDE 1000 ML: 600; 310; 30; 20 INJECTION, SOLUTION INTRAVENOUS at 08:16

## 2022-09-14 NOTE — ANESTHESIA POSTPROCEDURE EVALUATION
Patient: Jason Cervantes    Procedure Summary     Date: 09/14/22 Room / Location: [facility] ENDOSCOPY 3 / [facility] ENDOSCOPY    Anesthesia Start: 0911 Anesthesia Stop: 0931    Procedure: COLONOSCOPY, polypectomy x1 (N/A ) Diagnosis:       History of colon polyps      (History of colon polyps [Z86.010])    Surgeons: Moshe Guerra MD Provider: Machelle Ybarra CRNA    Anesthesia Type: MAC ASA Status: 3          Anesthesia Type: MAC    Vitals  Vitals Value Taken Time   /74 09/14/22 0959   Temp 98.1 °F (36.7 °C) 09/14/22 0934   Pulse 55 09/14/22 0959   Resp 20 09/14/22 0959   SpO2 94 % 09/14/22 0959           Post Anesthesia Care and Evaluation    Patient location during evaluation: PHASE II  Patient participation: complete - patient participated  Level of consciousness: awake and alert  Pain score: 0  Pain management: satisfactory to patient    Airway patency: patent  Anesthetic complications: No anesthetic complications  PONV Status: none  Cardiovascular status: acceptable and stable  Respiratory status: acceptable  Hydration status: acceptable    Comments: Vitals signs as noted in nursing documentation as per protocol.

## 2022-09-14 NOTE — ANESTHESIA PREPROCEDURE EVALUATION
Anesthesia Evaluation     Patient summary reviewed and Nursing notes reviewed   no history of anesthetic complications:  NPO Solid Status: > 8 hours  NPO Liquid Status: > 8 hours           Airway   Mallampati: II  TM distance: >3 FB  Possible difficult intubation, Large neck circumference and Difficult intubation highly probable  Dental    (+) upper dentures and lower dentures    Pulmonary    (+) pneumonia resolved , a smoker Former, COPD mild, asthma,shortness of breath, sleep apnea on CPAP, decreased breath sounds,   Cardiovascular     PT is on anticoagulation therapy  Patient on routine beta blocker    (+) hypertension, FALK, PVD, hyperlipidemia,   CAD:  inc risk.      Neuro/Psych  (+) headaches, poor historian.,    GI/Hepatic/Renal/Endo    (+) obesity, morbid obesity, GERD, GI bleeding ,   Diabetes:  inc risk.    Musculoskeletal     (+) arthralgias, back pain, chronic pain,   Abdominal   (+) obese,    Substance History      OB/GYN          Other   arthritis,                        Anesthesia Plan    ASA 3     MAC     (Risks and benefits discussed including risk of aspiration, recall and dental damage. All patient questions answered. Will continue with POC.)  intravenous induction     Anesthetic plan, risks, benefits, and alternatives have been provided, discussed and informed consent has been obtained with: patient.

## 2022-09-14 NOTE — DISCHARGE INSTRUCTIONS
To assist you in voiding:  Drink plenty of fluids  Listen to running water while attempting to void.    If you are unable to urinate and you have an uncomfortable urge to void or it has been   6 hours since you were discharged, return to the Emergency Room Please follow all post op instructions and follow up appointment time from your physician's office included in your discharge packet.  .    Rest today  No pushing,pulling,tugging,heavy lifting, or strenuous activity   No major decision making,driving,or drinking alcoholic beverages for 24 hours due to the sedation you received  Always use good hand hygiene/washing technique  No driving on pain medication.

## 2022-09-16 LAB — REF LAB TEST METHOD: NORMAL

## 2025-06-03 ENCOUNTER — TELEPHONE (OUTPATIENT)
Dept: SURGERY | Facility: CLINIC | Age: 65
End: 2025-06-03

## 2025-06-03 NOTE — TELEPHONE ENCOUNTER
Caller: IRVING    Relationship: Provider    Best call back number: [phone]     What form or medical record are you requesting: LAST COLONOSCOPY REPORT FROM 9/2022    Who is requesting this form or medical record from you: DR. TERRI LERNER    How would you like to receive the form or medical records (pick-up, mail, fax): FAX  If fax, what is the fax number: [phone]    Timeframe paperwork needed: ASAP    Additional notes:

## 2025-06-04 ENCOUNTER — OFFICE VISIT (OUTPATIENT)
Dept: SURGERY | Facility: CLINIC | Age: 65
End: 2025-06-04
Payer: MEDICARE

## 2025-06-04 VITALS
OXYGEN SATURATION: 96 % | DIASTOLIC BLOOD PRESSURE: 74 MMHG | TEMPERATURE: 98.4 F | BODY MASS INDEX: 29.2 KG/M2 | HEIGHT: 70 IN | SYSTOLIC BLOOD PRESSURE: 152 MMHG | WEIGHT: 204 LBS | HEART RATE: 69 BPM

## 2025-06-04 DIAGNOSIS — K62.5 RECTAL BLEED: Primary | ICD-10-CM

## 2025-06-04 PROCEDURE — 1160F RVW MEDS BY RX/DR IN RCRD: CPT | Performed by: SURGERY

## 2025-06-04 PROCEDURE — 99213 OFFICE O/P EST LOW 20 MIN: CPT | Performed by: SURGERY

## 2025-06-04 PROCEDURE — 1159F MED LIST DOCD IN RCRD: CPT | Performed by: SURGERY

## 2025-06-04 RX ORDER — FLECAINIDE ACETATE 50 MG/1
50 TABLET ORAL
COMMUNITY
Start: 2025-05-01

## 2025-06-04 RX ORDER — ATORVASTATIN CALCIUM 40 MG/1
40 TABLET, FILM COATED ORAL DAILY
COMMUNITY

## 2025-06-04 RX ORDER — CETIRIZINE HYDROCHLORIDE 10 MG/1
10 TABLET ORAL DAILY
COMMUNITY

## 2025-06-04 RX ORDER — ASCORBIC ACID 500 MG
500 TABLET ORAL DAILY
COMMUNITY

## 2025-06-04 NOTE — PROGRESS NOTES
Patient: Jason Cervantes    YOB: 1960    Date: 06/04/2025    Primary Care Provider: Cleveland Chance DO    Chief Complaint   Patient presents with    Rectal Bleeding       SUBJECTIVE:    History of present illness:  Patient has a history of TAAA, he is s/p aortic dissection repair.  I saw the patient in the office today as a consultation for evaluation and treatment of rectal bleeding.  Patient's last colonoscopy was performed 09/14/2022 at which time he had one transverse colon polyp removed.    He has had ascending aortic repair for aortic dissection, he also had to have reimplantation of what sounds like the carotid artery because of occlusion.    The following portions of the patient's history were reviewed and updated as appropriate: allergies, current medications, past family history, past medical history, past social history, past surgical history and problem list.      Review of Systems:  Constitutional:  Negative for chills, fever, and unexpected weight change.  HENT: Negative for trouble swallowing and voice change.  Eyes:  Negative for visual disturbance.  Respiratory:  Negative for apnea, cough, chest tightness, shortness of breath, and wheezing.  Cardiovascular:  Negative for chest pain, palpitations, and leg swelling.  Gastrointestinal:  Negative for abdominal distention, abdominal pain, constipation, diarrhea, nausea, rectal pain, and vomiting.  There is blood per rectum  Musculoskeletal:  Negative for back pain, gait problem, and joint swelling.  Skin:  Negative for color change, rash, and wound  Neurological:  Negative for dizziness, syncope, speech difficulty, weakness, numbness, and headaches.  Hematological:  Negative for adenopathy.  Does not bruise/bleed easily.  Psychiatric/Behavioral:  Negative for confusion.  The patient is not nervous/anxious.          History:  Past Medical History:   Diagnosis Date    Aorta aneurysm     REPORTS REPAIRED AROUND 2009 OR 2010    Arthritis  "    Asthma     Constipation     COPD (chronic obstructive pulmonary disease)     Cough     Elevated cholesterol     Enlarged prostate     Frequency of urination     Full dentures     INSTRUCTED NO ADHESIVES THE DOS    History of chest pain     History of fracture     REPORTS LEFT FEMUR, FACIAL BONES    History of irregular heartbeat     History of migraine     History of pneumonia     History of transfusion     REPORTS NO REACTION TO THE TRANSFUSION    Hypertension     Poor historian     Poor historian     REGARDING TIMELINE OF HEALTH HISTORY    Rectal bleeding     Sleep apnea     CPAP HS - TO BRING IN DOS    Spinal headache        Past Surgical History:   Procedure Laterality Date    AORTA SURGERY      ANEURYSM REPAIR.  PATIENT REPORTS IN 2009.  PATIENT REPORTS \"THERE ARE 10\" OF PLASTIC TUBE\"    APPENDECTOMY      CARDIAC CATHETERIZATION  2017    REPORTS THIS WAS THE LAST CATH THAT WAS DONE.  REPORTS NO ARDIAC STENTS.  REPORTS WAS DONE FOR EVALUATION OF AORTA ANEURYSM AND REPORTED THAT THIS IS LOCATED BEHIND THE HEART.     COLONOSCOPY      REPORTS 7 OR 8 TIMES IN THE PAST    COLONOSCOPY N/A 12/17/2018    Procedure: COLONOSCOPY WITH HOT BIOPSY POLYPECTOMY;  Surgeon: Moshe Guerra MD;  Location: [facility] ENDOSCOPY;  Service: Gastroenterology    COLONOSCOPY N/A 9/14/2022    Procedure: COLONOSCOPY, polypectomy x1;  Surgeon: Moshe Guerra MD;  Location: [facility] ENDOSCOPY;  Service: Gastroenterology;  Laterality: N/A;    ENDOSCOPY      REPORTS DONE 7 OR 8 TIMES PREVIOUSLY     EYE SURGERY Bilateral     CATARACT EXTRACTIONS    FACIAL RECONSTRUCTION SURGERY      FRACTURE SURGERY Left     FEMUR.  REPORTS HARDWARE INTACT.     MOUTH SURGERY      FULL MOUTH EXTRACTION       Family History   Problem Relation Age of Onset    Cancer Mother     Hypertension Mother     Diabetes Father     Hypertension Father        Social History     Tobacco Use    Smoking status: Never    Smokeless tobacco: Never   Vaping Use    Vaping status: " Never Used   Substance Use Topics    Alcohol use: No    Drug use: Never       Allergies:  Allergies   Allergen Reactions    Ciprofloxacin Itching    Tetracycline Itching       Medications:    Current Outpatient Medications:     acetaminophen (TYLENOL) 500 MG tablet, Take 2 tablets by mouth Every Morning. REPORTS TAKES EVERY MORNING WITH MOTRIN 400 MG, Disp: , Rfl:     albuterol (ACCUNEB) 1.25 MG/3ML nebulizer solution, Take 3 mL by nebulization Every 6 (Six) Hours As Needed for Wheezing or Shortness of Air., Disp: , Rfl:     amLODIPine (NORVASC) 10 MG tablet, Take 1 tablet by mouth Daily., Disp: , Rfl: 1    ascorbic acid (VITAMIN C) 500 MG tablet, Take 1 tablet by mouth Daily., Disp: , Rfl:     aspirin 325 MG tablet, Take 1 tablet by mouth Daily., Disp: , Rfl:     atorvastatin (LIPITOR) 40 MG tablet, Take 1 tablet by mouth Daily., Disp: , Rfl:     bisoprolol (ZEBeta) 5 MG tablet, Take 1 tablet by mouth Daily., Disp: , Rfl: 1    cetirizine (zyrTEC) 10 MG tablet, Take 1 tablet by mouth Daily., Disp: , Rfl:     Cholecalciferol 50 MCG (2000 UT) capsule, Take 1 capsule by mouth Daily., Disp: , Rfl:     cyanocobalamin (VITAMIN B-12) 250 MCG tablet, Take 1 tablet by mouth., Disp: , Rfl:     ferrous sulfate 325 (65 FE) MG tablet, FeroSul 325 mg (65 mg iron) tablet, Disp: , Rfl:     flecainide (TAMBOCOR) 50 MG tablet, Take 1 tablet by mouth., Disp: , Rfl:     fluticasone (FLONASE) 50 MCG/ACT nasal spray, fluticasone propionate 50 mcg/actuation nasal spray,suspension  Spray 2 sprays every day by intranasal route., Disp: , Rfl:     furosemide (LASIX) 20 MG tablet, Take 1 tablet by mouth Daily As Needed (SWELLING/FLUID RETENTION)., Disp: , Rfl:     ibuprofen (ADVIL,MOTRIN) 200 MG tablet, Take 2 tablets by mouth Every Morning. REPORTS TAKES WITH ACETAMINOPHEN 1000 MG, Disp: , Rfl:     linaclotide (LINZESS) 145 MCG capsule capsule, Linzess 145 mcg capsule  TAKE 1 CAPSULE(S) EVERY DAY BY ORAL ROUTE FOR 30 DAYS., Disp: , Rfl:      "montelukast (SINGULAIR) 10 MG tablet, , Disp: , Rfl:     omeprazole (priLOSEC) 40 MG capsule, Take 1 capsule by mouth Daily., Disp: , Rfl: 1    oxybutynin XL (DITROPAN-XL) 10 MG 24 hr tablet, Take 1 tablet by mouth Daily., Disp: , Rfl: 1    potassium chloride 10 MEQ CR tablet, , Disp: , Rfl:     predniSONE (DELTASONE) 20 MG tablet, Take 1 tablet by mouth Daily As Needed (ONSET OF EMPHYSEMA/BRONCHITIS EXACERBATION)., Disp: , Rfl: 0    SYMBICORT 160-4.5 MCG/ACT inhaler, Inhale 2 puffs 2 (Two) Times a Day., Disp: , Rfl: 1    tamsulosin (FLOMAX) 0.4 MG capsule 24 hr capsule, Take 1 capsule by mouth Daily., Disp: , Rfl: 1    bisacodyl (Dulcolax) 5 MG EC tablet, Take 2 @ 3pm, 2 @ 7 pm day prior to colonoscopy (Patient not taking: Reported on 6/4/2025), Disp: 4 tablet, Rfl: 0    losartan (COZAAR) 50 MG tablet, Take 50 mg by mouth Every Other Day. (Patient not taking: Reported on 9/13/2022), Disp: , Rfl:     simvastatin (ZOCOR) 40 MG tablet, Take 40 mg by mouth Every Night. (Patient not taking: Reported on 9/13/2022), Disp: , Rfl: 1    tiotropium (SPIRIVA) 18 MCG per inhalation capsule, Spiriva with HandiHaler 18 mcg and inhalation capsules (Patient not taking: Reported on 9/13/2022), Disp: , Rfl:     OBJECTIVE:    Vital Signs:   Vitals:    06/04/25 1537   BP: 152/74   Pulse: 69   Temp: 98.4 °F (36.9 °C)   TempSrc: Infrared   SpO2: 96%   Weight: 92.5 kg (204 lb)   Height: 177.8 cm (70\")         Physical Exam:     General Appearance:    Alert, cooperative, in no acute distress   Head:    Normocephalic, without obvious abnormality, atraumatic   Eyes:            Lids and lashes normal, conjunctivae and sclerae normal, no   icterus, no pallor, corneas clear, PERRLA   Ears:    Ears appear intact with no abnormalities noted   Throat:   No oral lesions, no thrush, oral mucosa moist   Neck:   No adenopathy, supple, trachea midline, no thyromegaly, no   carotid bruit, no JVD   Back:     No kyphosis present, no scoliosis present, no " skin lesions,      erythema or scars, no tenderness to percussion or                   palpation,   range of motion normal   Lungs:     Clear to auscultation,respirations regular, even and                  unlabored    Heart:    Regular rhythm and normal rate, normal S1 and S2, no            murmur, no gallop, no rub, no click   Chest Wall:    No abnormalities observed   Abdomen:     Normal bowel sounds, no masses, no organomegaly, soft        non-tender, non-distended, no guarding,    Extremities:   Moves all extremities well, no edema, no cyanosis, no             redness   Pulses:   Pulses palpable and equal bilaterally   Skin:   No bleeding, bruising or rash   Lymph nodes:   No palpable adenopathy   Neurologic:   Cranial nerves 2 - 12 grossly intact, sensation intact, DTR       present and equal bilaterally        Results Review:   I reviewed the patient's new clinical results.  I reviewed the patient's new imaging results and agree with the interpretation.  I reviewed the patient's other test results and agree with the interpretation    Review of Systems was reviewed and confirmed as accurate as documented by the MA.    ASSESSMENT/PLAN:    1. Rectal bleed        I did have a detailed and extensive discussion with the patient in the office today and he recently was taken off his Eliquis.  I want to wait on any colonoscopy at this time and see him back in the office in approximately 1 month.  He may not need repeat colonoscopy especially given his extensive past medical and surgical history, I want to see him back in a month and see how he is doing.    Electronically signed by Moshe Guerra MD  06/05/25 12:15 EDT

## 2025-06-16 NOTE — PROGRESS NOTES
Patient: Jason Cervantes    YOB: 1960    Date: 06/18/2025    Primary Care Provider: Cleveland Chance DO    Chief Complaint   Patient presents with    Follow-up     Rectal bleeding         SUBJECTIVE:    History of present illness:  Patient is s/p TAAA with aortic dissection repair which was performed in the remote past.  Patient is here for follow-up rectal bleeding.    The patient states since he stopped his Eliquis he is no longer had any further bleeding from the rectum.  He did have a colonoscopy performed a year and a half ago.    The following portions of the patient's history were reviewed and updated as appropriate: allergies, current medications, past family history, past medical history, past social history, past surgical history and problem list.      Review of Systems   Constitutional:  Negative for chills, fever and unexpected weight change.   HENT:  Negative for trouble swallowing and voice change.    Eyes:  Negative for visual disturbance.   Respiratory:  Negative for apnea, cough, chest tightness, shortness of breath and wheezing.    Cardiovascular:  Negative for chest pain, palpitations and leg swelling.   Gastrointestinal:  Negative for abdominal distention, abdominal pain, anal bleeding, blood in stool, constipation, diarrhea, nausea, rectal pain and vomiting.   Endocrine: Negative for cold intolerance and heat intolerance.   Genitourinary:  Negative for difficulty urinating, dysuria, flank pain, scrotal swelling and testicular pain.   Musculoskeletal:  Negative for back pain, gait problem and joint swelling.   Skin:  Negative for color change, rash and wound.   Neurological:  Negative for dizziness, syncope, speech difficulty, weakness, numbness and headaches.   Hematological:  Negative for adenopathy. Does not bruise/bleed easily.   Psychiatric/Behavioral:  Negative for confusion. The patient is not nervous/anxious.        Allergies:  Allergies   Allergen Reactions     Ciprofloxacin Itching    Tetracycline Itching       Medications:    Current Outpatient Medications:     acetaminophen (TYLENOL) 500 MG tablet, Take 2 tablets by mouth Every Morning. REPORTS TAKES EVERY MORNING WITH MOTRIN 400 MG, Disp: , Rfl:     albuterol (ACCUNEB) 1.25 MG/3ML nebulizer solution, Take 3 mL by nebulization Every 6 (Six) Hours As Needed for Wheezing or Shortness of Air., Disp: , Rfl:     amLODIPine (NORVASC) 10 MG tablet, Take 1 tablet by mouth Daily., Disp: , Rfl: 1    apixaban (Eliquis) 5 MG tablet tablet, Take 1 tablet twice a day by oral route for 30 days., Disp: , Rfl:     ascorbic acid (VITAMIN C) 500 MG tablet, Take 1 tablet by mouth Daily., Disp: , Rfl:     aspirin 325 MG tablet, Take 1 tablet by mouth Daily., Disp: , Rfl:     atorvastatin (LIPITOR) 40 MG tablet, Take 1 tablet by mouth Daily., Disp: , Rfl:     bisoprolol (ZEBeta) 5 MG tablet, Take 1 tablet by mouth Daily., Disp: , Rfl: 1    cetirizine (zyrTEC) 10 MG tablet, Take 1 tablet by mouth Daily., Disp: , Rfl:     Cholecalciferol 50 MCG (2000 UT) capsule, Take 1 capsule by mouth Daily., Disp: , Rfl:     cyanocobalamin (VITAMIN B-12) 250 MCG tablet, Take 1 tablet by mouth., Disp: , Rfl:     ferrous sulfate 325 (65 FE) MG tablet, FeroSul 325 mg (65 mg iron) tablet, Disp: , Rfl:     flecainide (TAMBOCOR) 50 MG tablet, Take 1 tablet by mouth., Disp: , Rfl:     fluticasone (FLONASE) 50 MCG/ACT nasal spray, fluticasone propionate 50 mcg/actuation nasal spray,suspension  Spray 2 sprays every day by intranasal route., Disp: , Rfl:     furosemide (LASIX) 20 MG tablet, Take 1 tablet by mouth Daily As Needed (SWELLING/FLUID RETENTION)., Disp: , Rfl:     HYDROcodone-acetaminophen (NORCO)  MG per tablet, Take 1 tablet 3 times a day by oral route as needed for 30 days., Disp: , Rfl:     hydrOXYzine (ATARAX) 25 MG tablet, Take 1 tablet 3 times a day by oral route as needed for 12 days., Disp: , Rfl:     ibuprofen (ADVIL,MOTRIN) 200 MG tablet,  Take 2 tablets by mouth Every Morning. REPORTS TAKES WITH ACETAMINOPHEN 1000 MG, Disp: , Rfl:     ipratropium-albuterol (DUO-NEB) 0.5-2.5 mg/3 ml nebulizer, Inhale 3 mL every 3-4 hours by nebulization route as needed., Disp: , Rfl:     linaclotide (LINZESS) 145 MCG capsule capsule, Linzess 145 mcg capsule  TAKE 1 CAPSULE(S) EVERY DAY BY ORAL ROUTE FOR 30 DAYS., Disp: , Rfl:     methocarbamol (ROBAXIN) 500 MG tablet, TAKE ONE (1) TABLET BY MOUTH TWO TIMES DAILY AS NEEDED, Disp: , Rfl:     montelukast (SINGULAIR) 10 MG tablet, , Disp: , Rfl:     olopatadine (PATANOL) 0.1 % ophthalmic solution, , Disp: , Rfl:     omeprazole (priLOSEC) 40 MG capsule, Take 1 capsule by mouth Daily., Disp: , Rfl: 1    oxybutynin XL (DITROPAN-XL) 10 MG 24 hr tablet, Take 1 tablet by mouth Daily., Disp: , Rfl: 1    potassium chloride 10 MEQ CR tablet, , Disp: , Rfl:     predniSONE (DELTASONE) 20 MG tablet, Take 1 tablet by mouth Daily As Needed (ONSET OF EMPHYSEMA/BRONCHITIS EXACERBATION)., Disp: , Rfl: 0    SYMBICORT 160-4.5 MCG/ACT inhaler, Inhale 2 puffs 2 (Two) Times a Day., Disp: , Rfl: 1    tamsulosin (FLOMAX) 0.4 MG capsule 24 hr capsule, Take 1 capsule by mouth Daily., Disp: , Rfl: 1    bisacodyl (Dulcolax) 5 MG EC tablet, Take 2 @ 3pm, 2 @ 7 pm day prior to colonoscopy (Patient not taking: Reported on 6/18/2025), Disp: 4 tablet, Rfl: 0    losartan (COZAAR) 50 MG tablet, Take 50 mg by mouth Every Other Day. (Patient not taking: Reported on 9/13/2022), Disp: , Rfl:     simvastatin (ZOCOR) 40 MG tablet, Take 40 mg by mouth Every Night. (Patient not taking: Reported on 9/13/2022), Disp: , Rfl: 1    tiotropium (SPIRIVA) 18 MCG per inhalation capsule, Spiriva with HandiHaler 18 mcg and inhalation capsules (Patient not taking: Reported on 9/13/2022), Disp: , Rfl:     History:  Past Medical History:   Diagnosis Date    Aorta aneurysm     REPORTS REPAIRED AROUND 2009 OR 2010    Arthritis     Asthma     Constipation     COPD (chronic  "obstructive pulmonary disease)     Cough     Elevated cholesterol     Enlarged prostate     Frequency of urination     Full dentures     INSTRUCTED NO ADHESIVES THE DOS    History of chest pain     History of fracture     REPORTS LEFT FEMUR, FACIAL BONES    History of irregular heartbeat     History of migraine     History of pneumonia     History of transfusion     REPORTS NO REACTION TO THE TRANSFUSION    Hypertension     Poor historian     Poor historian     REGARDING TIMELINE OF HEALTH HISTORY    Rectal bleeding     Sleep apnea     CPAP HS - TO BRING IN DOS    Spinal headache        Past Surgical History:   Procedure Laterality Date    AORTA SURGERY      ANEURYSM REPAIR.  PATIENT REPORTS IN 2009.  PATIENT REPORTS \"THERE ARE 10\" OF PLASTIC TUBE\"    APPENDECTOMY      CARDIAC CATHETERIZATION  2017    REPORTS THIS WAS THE LAST CATH THAT WAS DONE.  REPORTS NO ARDIAC STENTS.  REPORTS WAS DONE FOR EVALUATION OF AORTA ANEURYSM AND REPORTED THAT THIS IS LOCATED BEHIND THE HEART.     COLONOSCOPY      REPORTS 7 OR 8 TIMES IN THE PAST    COLONOSCOPY N/A 12/17/2018    Procedure: COLONOSCOPY WITH HOT BIOPSY POLYPECTOMY;  Surgeon: Moshe Guerra MD;  Location: [facility] ENDOSCOPY;  Service: Gastroenterology    COLONOSCOPY N/A 9/14/2022    Procedure: COLONOSCOPY, polypectomy x1;  Surgeon: Moshe Guerra MD;  Location: [facility] ENDOSCOPY;  Service: Gastroenterology;  Laterality: N/A;    ENDOSCOPY      REPORTS DONE 7 OR 8 TIMES PREVIOUSLY     EYE SURGERY Bilateral     CATARACT EXTRACTIONS    FACIAL RECONSTRUCTION SURGERY      FRACTURE SURGERY Left     FEMUR.  REPORTS HARDWARE INTACT.     MOUTH SURGERY      FULL MOUTH EXTRACTION       Family History   Problem Relation Age of Onset    Cancer Mother     Hypertension Mother     Diabetes Father     Hypertension Father        Social History     Tobacco Use    Smoking status: Never    Smokeless tobacco: Never   Vaping Use    Vaping status: Never Used   Substance Use Topics    Alcohol " "use: No    Drug use: Never        OBJECTIVE:    Vital Signs:   Vitals:    06/18/25 1434   BP: 168/72   Pulse: 71   Temp: 97.7 °F (36.5 °C)   TempSrc: Infrared   SpO2: 97%   Weight: 92.5 kg (204 lb)   Height: 177.8 cm (70\")       Physical Exam:   General Appearance:    Alert, cooperative, in no acute distress   Head:    Normocephalic, without obvious abnormality, atraumatic   Eyes:            Lids and lashes normal, conjunctivae and sclerae normal, no   icterus, no pallor, corneas clear, PERRLA   Ears:    Ears appear intact with no abnormalities noted   Throat:   No oral lesions, no thrush, oral mucosa moist   Neck:   No adenopathy, supple, trachea midline, no thyromegaly, no   carotid bruit, no JVD   Lungs:     Clear to auscultation,respirations regular, even and                  unlabored    Heart:    Regular rhythm and normal rate, normal S1 and S2, no            murmur, no gallop, no rub, no click   Chest Wall:    No abnormalities observed   Abdomen:     Normal bowel sounds, no masses, no organomegaly, soft        non-tender, non-distended, no guarding, no rebound                tenderness   Extremities:   Moves all extremities well, no edema, no cyanosis, no             redness   Pulses:   Pulses palpable and equal bilaterally   Skin:   No bleeding, bruising or rash   Lymph nodes:   No palpable adenopathy   Neurologic:   Cranial nerves 2 - 12 grossly intact, sensation intact, DTR       present and equal bilaterally       Results Review:   I reviewed the patient's new clinical results.  I reviewed the patient's new imaging results and agree with the interpretation.  I reviewed the patient's other test results and agree with the interpretation    Review of Systems was reviewed and confirmed as accurate as documented by the MA.    ASSESSMENT/PLAN:    1. Rectal bleed         I do not think the patient needs any further intervention.  This is most likely related to his Eliquis usage, he needs to check back with his " cardiologist about the possibility of restarting this medication.  He knows to see me back in the office if he has any further problems.    I discussed the patients findings and my recommendations with patient and family        Electronically signed by Moshe Guerra MD  06/19/25

## 2025-06-18 ENCOUNTER — OFFICE VISIT (OUTPATIENT)
Dept: SURGERY | Facility: CLINIC | Age: 65
End: 2025-06-18
Payer: MEDICARE

## 2025-06-18 VITALS
HEIGHT: 70 IN | DIASTOLIC BLOOD PRESSURE: 72 MMHG | BODY MASS INDEX: 29.2 KG/M2 | WEIGHT: 204 LBS | TEMPERATURE: 97.7 F | OXYGEN SATURATION: 97 % | SYSTOLIC BLOOD PRESSURE: 168 MMHG | HEART RATE: 71 BPM

## 2025-06-18 DIAGNOSIS — K62.5 RECTAL BLEED: Primary | ICD-10-CM

## 2025-06-18 RX ORDER — HYDROXYZINE HYDROCHLORIDE 25 MG/1
TABLET, FILM COATED ORAL
COMMUNITY

## 2025-06-18 RX ORDER — IPRATROPIUM BROMIDE AND ALBUTEROL SULFATE 2.5; .5 MG/3ML; MG/3ML
SOLUTION RESPIRATORY (INHALATION)
COMMUNITY

## 2025-06-18 RX ORDER — OLOPATADINE HYDROCHLORIDE 1 MG/ML
SOLUTION OPHTHALMIC
COMMUNITY

## 2025-06-18 RX ORDER — METHOCARBAMOL 500 MG/1
TABLET, FILM COATED ORAL
COMMUNITY

## 2025-06-18 RX ORDER — HYDROCODONE BITARTRATE AND ACETAMINOPHEN 10; 325 MG/1; MG/1
TABLET ORAL
COMMUNITY

## 2025-07-18 ENCOUNTER — TRANSCRIBE ORDERS (OUTPATIENT)
Dept: ADMINISTRATIVE | Facility: HOSPITAL | Age: 65
End: 2025-07-18
Payer: MEDICARE

## 2025-07-18 DIAGNOSIS — Z86.79 PERSONAL HISTORY OF UNSPECIFIED CIRCULATORY DISEASE: ICD-10-CM

## 2025-07-18 DIAGNOSIS — Z98.890 HISTORY OF AAA (ABDOMINAL AORTIC ANEURYSM) REPAIR: Primary | ICD-10-CM

## 2025-07-24 ENCOUNTER — HOSPITAL ENCOUNTER (OUTPATIENT)
Dept: CARDIOLOGY | Facility: HOSPITAL | Age: 65
Discharge: HOME | End: 2025-07-24
Admitting: THORACIC SURGERY (CARDIOTHORACIC VASCULAR SURGERY)
Payer: MEDICARE

## 2025-07-24 VITALS
DIASTOLIC BLOOD PRESSURE: 62 MMHG | BODY MASS INDEX: 29.2 KG/M2 | WEIGHT: 204 LBS | SYSTOLIC BLOOD PRESSURE: 124 MMHG | HEIGHT: 70 IN

## 2025-07-24 DIAGNOSIS — Z86.79 PERSONAL HISTORY OF UNSPECIFIED CIRCULATORY DISEASE: ICD-10-CM

## 2025-07-24 DIAGNOSIS — Z98.890 HISTORY OF AAA (ABDOMINAL AORTIC ANEURYSM) REPAIR: ICD-10-CM

## 2025-07-24 PROCEDURE — 93306 TTE W/DOPPLER COMPLETE: CPT

## 2025-07-25 LAB
AORTIC DIMENSIONLESS INDEX: 0.74 (DI)
AV MEAN PRESS GRAD SYS DOP V1V2: 3 MMHG
AV VMAX SYS DOP: 122 CM/SEC
BH CV ECHO MEAS - AO MAX PG: 6 MMHG
BH CV ECHO MEAS - AO ROOT DIAM: 4 CM
BH CV ECHO MEAS - AO V2 VTI: 27.8 CM
BH CV ECHO MEAS - AVA(I,D): 3.1 CM2
BH CV ECHO MEAS - EDV(CUBED): 117.6 ML
BH CV ECHO MEAS - EDV(MOD-SP2): 130 ML
BH CV ECHO MEAS - EDV(MOD-SP4): 106 ML
BH CV ECHO MEAS - EF(MOD-SP2): 61.9 %
BH CV ECHO MEAS - EF(MOD-SP4): 63.1 %
BH CV ECHO MEAS - ESV(CUBED): 34.6 ML
BH CV ECHO MEAS - ESV(MOD-SP2): 49.5 ML
BH CV ECHO MEAS - ESV(MOD-SP4): 39.1 ML
BH CV ECHO MEAS - FS: 33.5 %
BH CV ECHO MEAS - IVS/LVPW: 1.13 CM
BH CV ECHO MEAS - IVSD: 1.48 CM
BH CV ECHO MEAS - LA DIMENSION: 4.7 CM
BH CV ECHO MEAS - LAT PEAK E' VEL: 12.1 CM/SEC
BH CV ECHO MEAS - LV DIASTOLIC VOL/BSA (35-75): 50.4 CM2
BH CV ECHO MEAS - LV MASS(C)D: 281.1 GRAMS
BH CV ECHO MEAS - LV MAX PG: 2.6 MMHG
BH CV ECHO MEAS - LV MEAN PG: 2 MMHG
BH CV ECHO MEAS - LV SYSTOLIC VOL/BSA (12-30): 18.6 CM2
BH CV ECHO MEAS - LV V1 MAX: 81.2 CM/SEC
BH CV ECHO MEAS - LV V1 VTI: 20.6 CM
BH CV ECHO MEAS - LVIDD: 4.9 CM
BH CV ECHO MEAS - LVIDS: 3.3 CM
BH CV ECHO MEAS - LVOT AREA: 4.2 CM2
BH CV ECHO MEAS - LVOT DIAM: 2.32 CM
BH CV ECHO MEAS - LVPWD: 1.31 CM
BH CV ECHO MEAS - MED PEAK E' VEL: 8.9 CM/SEC
BH CV ECHO MEAS - MV A MAX VEL: 60.3 CM/SEC
BH CV ECHO MEAS - MV DEC TIME: 0.16 SEC
BH CV ECHO MEAS - MV E MAX VEL: 70.2 CM/SEC
BH CV ECHO MEAS - MV E/A: 1.16
BH CV ECHO MEAS - MV MAX PG: 3.5 MMHG
BH CV ECHO MEAS - MV MEAN PG: 1 MMHG
BH CV ECHO MEAS - MV V2 VTI: 28.8 CM
BH CV ECHO MEAS - MVA(VTI): 3 CM2
BH CV ECHO MEAS - PA ACC TIME: 0.12 SEC
BH CV ECHO MEAS - PA V2 MAX: 121 CM/SEC
BH CV ECHO MEAS - RV MAX PG: 2.3 MMHG
BH CV ECHO MEAS - RV V1 MAX: 75.8 CM/SEC
BH CV ECHO MEAS - RV V1 VTI: 17.5 CM
BH CV ECHO MEAS - SV(LVOT): 87.1 ML
BH CV ECHO MEAS - SV(MOD-SP2): 80.5 ML
BH CV ECHO MEAS - SV(MOD-SP4): 66.9 ML
BH CV ECHO MEAS - SVI(LVOT): 41.4 ML/M2
BH CV ECHO MEAS - SVI(MOD-SP2): 38.2 ML/M2
BH CV ECHO MEAS - SVI(MOD-SP4): 31.8 ML/M2
BH CV ECHO MEASUREMENTS AVERAGE E/E' RATIO: 6.69
LEFT ATRIUM VOLUME INDEX: 47.6 ML/M2
LV EF BIPLANE MOD: 61.2 %

## (undated) DEVICE — PATIENT RETURN ELECTRODE, SINGLE-USE, CONTACT QUALITY MONITORING, ADULT, WITH 15 FT (4.5 M) CORD. FOR PATIENTS WEIGHING OVER 33LBS. (15KG): Brand: MEGADYNE

## (undated) DEVICE — PAD GRND REM POLYHESIVE A/ DISP

## (undated) DEVICE — HYBRID TUBING/CAP SET FOR OLYMPUS® SCOPES: Brand: ERBE

## (undated) DEVICE — GLV SURG SENSICARE W/ALOE PF LF 8.5 STRL

## (undated) DEVICE — Device

## (undated) DEVICE — FRCP BIOP RADLJAW4 HOT 2.2X240 BX40

## (undated) DEVICE — LUBE JELLY PK/2.75GM STRL BX/144

## (undated) DEVICE — JELLY,LUBE,STERILE,FLIP TOP,TUBE,2-OZ: Brand: MEDLINE

## (undated) DEVICE — ENDOGATOR AUXILIARY WATER JET CONNECTOR: Brand: ENDOGATOR

## (undated) DEVICE — VLV SXN AIR/H2O ORCAPOD3 1P/U STRL

## (undated) DEVICE — ENDOSCOPY PORT CONNECTOR FOR OLYMPUS® SCOPES: Brand: ERBE

## (undated) DEVICE — KT ORCA VLV SXN AIR/H2O W/SEAL 1P/U STRL